# Patient Record
Sex: FEMALE | Race: WHITE | Employment: FULL TIME | URBAN - METROPOLITAN AREA
[De-identification: names, ages, dates, MRNs, and addresses within clinical notes are randomized per-mention and may not be internally consistent; named-entity substitution may affect disease eponyms.]

---

## 2017-08-05 ENCOUNTER — HOSPITAL ENCOUNTER (EMERGENCY)
Facility: CLINIC | Age: 22
Discharge: HOME | End: 2017-08-05
Attending: EMERGENCY MEDICINE | Admitting: EMERGENCY MEDICINE
Payer: COMMERCIAL

## 2017-08-05 VITALS
TEMPERATURE: 98.1 F | RESPIRATION RATE: 20 BRPM | DIASTOLIC BLOOD PRESSURE: 78 MMHG | SYSTOLIC BLOOD PRESSURE: 117 MMHG | OXYGEN SATURATION: 98 % | HEIGHT: 64 IN | BODY MASS INDEX: 21.34 KG/M2 | WEIGHT: 125 LBS

## 2017-08-05 DIAGNOSIS — S05.02XA CORNEAL ABRASION, LEFT, INITIAL ENCOUNTER: ICD-10-CM

## 2017-08-05 DIAGNOSIS — H16.9 KERATITIS: ICD-10-CM

## 2017-08-05 PROCEDURE — 99283 EMERGENCY DEPT VISIT LOW MDM: CPT

## 2017-08-05 RX ORDER — OFLOXACIN 3 MG/ML
1 SOLUTION/ DROPS OPHTHALMIC EVERY 4 HOURS
Qty: 1 BOTTLE | Refills: 0 | Status: SHIPPED | OUTPATIENT
Start: 2017-08-05

## 2017-08-05 RX ORDER — PROPARACAINE HYDROCHLORIDE 5 MG/ML
SOLUTION/ DROPS OPHTHALMIC
Status: DISCONTINUED
Start: 2017-08-05 | End: 2017-08-05 | Stop reason: HOSPADM

## 2017-08-05 RX ORDER — HYDROCODONE BITARTRATE AND ACETAMINOPHEN 5; 325 MG/1; MG/1
1-2 TABLET ORAL EVERY 4 HOURS PRN
Qty: 15 TABLET | Refills: 0 | Status: SHIPPED | OUTPATIENT
Start: 2017-08-05

## 2017-08-05 ASSESSMENT — ENCOUNTER SYMPTOMS
NUMBNESS: 0
LIGHT-HEADEDNESS: 0
EYE ITCHING: 0
CHILLS: 0
EYE DISCHARGE: 0
HEADACHES: 0
FACIAL SWELLING: 0
FATIGUE: 0
DIZZINESS: 0
EYE PAIN: 1
FEVER: 0

## 2017-08-05 ASSESSMENT — VISUAL ACUITY
OD: NOT TESTED
OS: NOT TESTED

## 2017-08-05 NOTE — ED AVS SNAPSHOT
Emergency Department    [address] [place] [address]    Phone:  [phone]    Fax:  [phone]                                       Ciera Talley   MRN: [id number]    Department:   Emergency Department   Date of Visit:  8/5/2017           After Visit Summary Signature Page     I have received my discharge instructions, and my questions have been answered. I have discussed any challenges I see with this plan with the nurse or doctor.    ..........................................................................................................................................  Patient/Patient Representative Signature      ..........................................................................................................................................  Patient Representative Print Name and Relationship to Patient    ..................................................               ................................................  Date                                            Time    ..........................................................................................................................................  Reviewed by Signature/Title    ...................................................              ..............................................  Date                                                            Time

## 2017-08-05 NOTE — ED PROVIDER NOTES
"  History     Chief Complaint:  Eye Pain (Pt states started having R eye pain this morning, she wears contacts, but does not think one is stuck.  She denies any trauma to eye.)      LE Talley is a 21 year old female who presents with acute onset right eye pain.  Woke up this morning feeling slightly irritated and right eye, states her contacts were dry yesterday and she was able to \"squeezing a bubble out of them\" and then remove them as normal.  Went to bed with no ocular symptoms.    States pain is constant severe burning and sharp at the right eye, made worse by opening eye and any light perception.  There is no fevers shakes chills no facial swelling.  Never had this issue before, as her contacts for years without complications    Allergies:  No Known Allergies     Medications:      Sertraline HCl (ZOLOFT PO)   ofloxacin (OCUFLOX) 0.3 % ophthalmic solution       Problem List:    There are no active problems to display for this patient.       Past Medical History:    Past Medical History:   Diagnosis Date     Leukemia (H)        Past Surgical History:    History reviewed. No pertinent surgical history.    Family History:    No family history on file.    Social History:  Marital Status:    Social History   Substance Use Topics     Smoking status: Never Smoker     Smokeless tobacco: Never Used     Alcohol use Yes      Comment: weekends        Review of Systems   Constitutional: Negative for chills, fatigue and fever.   HENT: Negative for dental problem, ear discharge, ear pain and facial swelling.    Eyes: Positive for pain. Negative for discharge and itching.   Neurological: Negative for dizziness, light-headedness, numbness and headaches.       Physical Exam   First Vitals:  BP: 117/78  Heart Rate: 96  Temp: 98.1  F (36.7  C)  Resp: 20  Height: 162.6 cm (5' 4\")  Weight: 56.7 kg (125 lb)  SpO2: 98 %    Physical Exam  General: Seen lying in bed, well appearing, alert and pleasant  Eyes:   OU:  VF in tact " "x 4  EOMI  PERRLA    OD:  Red and injected conj  + photophobia (non consensual)  + Flouro uptake in punctate circular pattern over cornea  Neg sidels  No ice rink  No FB seen  Ant chamber clear    CV: No JVD, no pallor  Resp: no tachypnea, speaking in full sentences   Skin: no rashes seen, no e/o trauma  Neuro: SALCIDO spontaneously        Emergency Department Course   Interventions:  Immediate pain relief with proparacaine      Impression & Plan      Medical Decision Makin-year-old female who appears to be acute punctate keratitis.  Supporting this diagnosis is immediate pain relief with proparacaine, and punctate fluorescein uptake on exam.  Patient was has a normal visual exam, I appreciate asymmetry in her visual acuity, but patient exam done without contact lenses, and patient states that she is \"near blind\" without assistance.  He has no evidence of iritis or endophthalmitis, anterior chamber is clear.  Given fluorescein pattern and contact use, will treat with Ocuflox antibiotics and recommend pain medication.  Patient states she can follow with her ophthalmologist the next 48 hours.  Very clear return ED precautions were given to patient, understands not to wear contacts until all symptoms are relieved.    Diagnosis:    ICD-10-CM    1. Keratitis H16.9    2. Corneal abrasion, left, initial encounter S05.02XA        Disposition:  Discharged home    Discharge Medications:  New Prescriptions    OFLOXACIN (OCUFLOX) 0.3 % OPHTHALMIC SOLUTION    Apply 1 drop to eye every 4 hours         Mike Lange  2017    EMERGENCY DEPARTMENT       Mike Lange MD  17 1246    "

## 2017-08-05 NOTE — ED AVS SNAPSHOT
Emergency Department    [address] [facility] [address]    Phone:  [phone]    Fax:  [phone]                                       Ciera Talley   MRN: [id number]    Department:   Emergency Department   Date of Visit:  8/5/2017           Patient Information     Date Of Birth          1995        Your diagnoses for this visit were:     Keratitis     Corneal abrasion, left, initial encounter        You were seen by Mike Lange MD.      Follow-up Information     Follow up with  Emergency Department.    Specialty:  EMERGENCY MEDICINE    Why:  If symptoms worsen    Contact information:    [address] [address] [phone]        Please follow up.    Why:  OPHTHALMOLOGY APPOINTMENT IN NEXT 2-3 DAYS FOR FOLLOW UP.          Follow up with [facility]. Schedule an appointment as soon as possible for a visit in 2 days.    Contact information:    [address] [facility]  [facility] [phone]        Discharge Instructions         Contact Lens Injury    Your contact lens can cause injury to the cornea (the clear part in the front of the eye). This can occur by sleeping with a hard or soft contact lens in place or wearing a contact lens longer than advised. There is also an increased risk of injury if your eyes dry out too much while wearing a contact lens.  The cornea is very painful when injured, but it usually heals quickly. It usually improves within 24 to 48 hours. If the injury is deep, your healthcare provider may apply an eye patch. This is to reduce pain and speed up the healing process. An antibiotic ointment or eye drops may also be used. Healing is complete when the pain stops and there are no other symptoms, such as eye redness, tearing or discharge, or worsening vision.  Home care    Do not wear contacts until you are pain free.    A cold pack (ice in a plastic bag, wrapped  in a towel) may be applied over the eye for 20 minutes at a time to reduce pain.    Acetaminophen or ibuprofen can be used for pain, unless another medicine was prescribed. (Note: If you have chronic liver or kidney disease, or have ever had a stomach ulcer or gastrointestinal bleeding, talk with your healthcare provider before using these medicines.)    If an eye patch was applied:    Apply the ice pack directly over the eye patch as described above.    If you were given a  follow-up appointment for patch removal and re-exam, do not miss it. An eye patch should not be left in place for more than 48 hours, unless advised to do so by your healthcare provider.    Do not drive a motor vehicle or operate machinery with the patch in place. You will have difficulty in judging distances with only one eye.  Follow-up care  Follow up with your healthcare provider, or as advised.  When to seek medical advice  Call your healthcare provider right away if any of these occur:    Increasing eye pain or pain that does not improve after 24 hours    Discharge from the eye    Increasing redness of the eye or swelling of the eyelids    Worsening vision  Date Last Reviewed: 6/14/2015 2000-2017 [employer]. [address] [place] [address]. All rights reserved. This information is not intended as a substitute for professional medical care. Always follow your healthcare professional's instructions.          Corneal Abrasion    You have received a scratch or scrape (abrasion) to your cornea. The cornea is the clear part in the front of the eye. This sensitive area is very painful when injured. You may make tears frequently, and your vision may be blurry until the injury heals. You may be sensitive to light.  This part of the body heals quickly. You can expect the pain to go away within 24 to 48 hours. If the abrasion is large or deep, your doctor may apply an eye patch, although this is not always done. An  antibiotic ointment or eye drops may also be used to prevent infection.  Numbing drops may be used to relieve the pain temporarily so that your eyes can be examined. However, these drops cannot be prescribed for home use because that would prevent healing and lead to more serious problems. Also, if you can t feel your eye, there is a chance of accidentally injuring it further without knowing it.  Home care    A cold pack (ice in a plastic bag, wrapped in a towel) may be applied over the eye (or eye patch) for 20 minutes at a time, to reduce pain.    You may use acetaminophen or ibuprofen to control pain, unless another pain medicine was prescribed. Note: If you have chronic liver or kidney disease or ever had a stomach ulcer or GI bleeding, talk with your doctor before using these medicines.    Rest your eyes and do not read until symptoms are gone.    If you use contact lenses, do not wear them until all symptoms are gone.    If your vision is affected by the corneal abrasion or if an eye patch was applied, do not drive a motor vehicle or operate machinery until all symptoms are gone. You may have trouble judging distances using only one eye.    If your eyes are sensitive to light, try wearing sunglasses, or stay indoors until symptoms go away.  Follow-up care  Follow up with your health care provider, or as advised.    If no patch was put on your eye, and used but the pain continues for more than 48 hours, you should have another exam. Return to this facility or contact your health care provider to arrange this.    If your eye was patched and you were asked to remove the patch yourself, see your health care provider. You may also return to this facility if you still have pain after the patch is removed.    If you were given a return appointment for patch removal and re-examination, be sure to keep the appointment. Leaving the patch in place longer than advised could be harmful.  When to seek medical advice  Call  your health care provider right away if any of these occur.    Increasing eye pain or pain that does not improve after 24 hours    Discharge from the eye    Increasing redness of the eye or swelling of the eyelids    Worsening vision    Symptoms that worsen after the abrasion has healed  Date Last Reviewed: 6/14/2015 2000-2017 [employer]. [address] [place] [address]. All rights reserved. This information is not intended as a substitute for professional medical care. Always follow your healthcare professional's instructions.          24 Hour Appointment Hotline       To make an appointment at any [facility], call [phone] ([phone]). If you don't have a family doctor or clinic, we will help you find one. [facility] clinics are conveniently located to serve the needs of you and your family.             Review of your medicines      START taking        Dose / Directions Last dose taken    ofloxacin 0.3 % ophthalmic solution   Commonly known as:  OCUFLOX   Dose:  1 drop   Quantity:  1 Bottle        Apply 1 drop to eye every 4 hours   Refills:  0          Our records show that you are taking the medicines listed below. If these are incorrect, please call your family doctor or clinic.        Dose / Directions Last dose taken    ZOLOFT PO        Take by mouth daily   Refills:  0                Prescriptions were sent or printed at these locations (1 Prescription)                   Other Prescriptions                Printed at Department/Unit printer (1 of 1)         ofloxacin (OCUFLOX) 0.3 % ophthalmic solution                Orders Needing Specimen Collection     None      Pending Results     No orders found from 8/3/2017 to 8/6/2017.            Pending Culture Results     No orders found from 8/3/2017 to 8/6/2017.            Pending Results Instructions     If you had any lab results that were not finalized at the time of your Discharge, you can call the ED Lab Result RN at  [phone]. You will be contacted by this team for any positive Lab results or changes in treatment. The nurses are available 7 days a week from 10A to 6:30P.  You can leave a message 24 hours per day and they will return your call.        Test Results From Your Hospital Stay               Clinical Quality Measure: Blood Pressure Screening     Your blood pressure was checked while you were in the emergency department today. The last reading we obtained was  BP: 117/78 . Please read the guidelines below about what these numbers mean and what you should do about them.  If your systolic blood pressure (the top number) is less than 120 and your diastolic blood pressure (the bottom number) is less than 80, then your blood pressure is normal. There is nothing more that you need to do about it.  If your systolic blood pressure (the top number) is 120-139 or your diastolic blood pressure (the bottom number) is 80-89, your blood pressure may be higher than it should be. You should have your blood pressure rechecked within a year by a primary care provider.  If your systolic blood pressure (the top number) is 140 or greater or your diastolic blood pressure (the bottom number) is 90 or greater, you may have high blood pressure. High blood pressure is treatable, but if left untreated over time it can put you at risk for heart attack, stroke, or kidney failure. You should have your blood pressure rechecked by a primary care provider within the next 4 weeks.  If your provider in the emergency department today gave you specific instructions to follow-up with your doctor or provider even sooner than that, you should follow that instruction and not wait for up to 4 weeks for your follow-up visit.        Thank you for choosing [facility]       Thank you for choosing [facility] for your care. Our goal is always to provide you with excellent care. Hearing back from our patients is one way we can continue to improve our services. Please  "take a few minutes to complete the written survey that you may receive in the mail after you visit with us. Thank you!        [employer] Information     [employer] lets you send messages to your doctor, view your test results, renew your prescriptions, schedule appointments and more. To sign up, go to [email or web address] . Click on \"Log in\" on the left side of the screen, which will take you to the Welcome page. Then click on \"Sign up Now\" on the right side of the page.     You will be asked to enter the access code listed below, as well as some personal information. Please follow the directions to create your username and password.     Your access code is: [email or web address]  Expires: 11/3/2017 12:44 PM     Your access code will  in 90 days. If you need help or a new code, please call your [facility] clinic or [phone].        Care EveryWhere ID     This is your Care EveryWhere ID. This could be used by other organizations to access your [facility] medical records  [id number]        Equal Access to Services     [facility]: Hadshana Steel, waaxda lukwasi, qaybta kaaljulia sousa, jayson thompson . So [facility] [phone].    ATENCIÓN: Si habla español, tiene a wilcox disposición servicios gratuitos de asistencia lingüística. Llame al [phone].    We comply with applicable federal civil rights laws and [place] laws. We do not discriminate on the basis of race, color, national origin, age, disability sex, sexual orientation or gender identity.            After Visit Summary       This is your record. Keep this with you and show to your community pharmacist(s) and doctor(s) at your next visit.                  "

## 2017-08-05 NOTE — DISCHARGE INSTRUCTIONS
Contact Lens Injury    Your contact lens can cause injury to the cornea (the clear part in the front of the eye). This can occur by sleeping with a hard or soft contact lens in place or wearing a contact lens longer than advised. There is also an increased risk of injury if your eyes dry out too much while wearing a contact lens.  The cornea is very painful when injured, but it usually heals quickly. It usually improves within 24 to 48 hours. If the injury is deep, your healthcare provider may apply an eye patch. This is to reduce pain and speed up the healing process. An antibiotic ointment or eye drops may also be used. Healing is complete when the pain stops and there are no other symptoms, such as eye redness, tearing or discharge, or worsening vision.  Home care    Do not wear contacts until you are pain free.    A cold pack (ice in a plastic bag, wrapped in a towel) may be applied over the eye for 20 minutes at a time to reduce pain.    Acetaminophen or ibuprofen can be used for pain, unless another medicine was prescribed. (Note: If you have chronic liver or kidney disease, or have ever had a stomach ulcer or gastrointestinal bleeding, talk with your healthcare provider before using these medicines.)    If an eye patch was applied:    Apply the ice pack directly over the eye patch as described above.    If you were given a  follow-up appointment for patch removal and re-exam, do not miss it. An eye patch should not be left in place for more than 48 hours, unless advised to do so by your healthcare provider.    Do not drive a motor vehicle or operate machinery with the patch in place. You will have difficulty in judging distances with only one eye.  Follow-up care  Follow up with your healthcare provider, or as advised.  When to seek medical advice  Call your healthcare provider right away if any of these occur:    Increasing eye pain or pain that does not improve after 24 hours    Discharge from the  eye    Increasing redness of the eye or swelling of the eyelids    Worsening vision  Date Last Reviewed: 6/14/2015 2000-2017 [employer]. [address] [place] [address], [place] [address]. All rights reserved. This information is not intended as a substitute for professional medical care. Always follow your healthcare professional's instructions.          Corneal Abrasion    You have received a scratch or scrape (abrasion) to your cornea. The cornea is the clear part in the front of the eye. This sensitive area is very painful when injured. You may make tears frequently, and your vision may be blurry until the injury heals. You may be sensitive to light.  This part of the body heals quickly. You can expect the pain to go away within 24 to 48 hours. If the abrasion is large or deep, your doctor may apply an eye patch, although this is not always done. An antibiotic ointment or eye drops may also be used to prevent infection.  Numbing drops may be used to relieve the pain temporarily so that your eyes can be examined. However, these drops cannot be prescribed for home use because that would prevent healing and lead to more serious problems. Also, if you can t feel your eye, there is a chance of accidentally injuring it further without knowing it.  Home care    A cold pack (ice in a plastic bag, wrapped in a towel) may be applied over the eye (or eye patch) for 20 minutes at a time, to reduce pain.    You may use acetaminophen or ibuprofen to control pain, unless another pain medicine was prescribed. Note: If you have chronic liver or kidney disease or ever had a stomach ulcer or GI bleeding, talk with your doctor before using these medicines.    Rest your eyes and do not read until symptoms are gone.    If you use contact lenses, do not wear them until all symptoms are gone.    If your vision is affected by the corneal abrasion or if an eye patch was applied, do not drive a motor vehicle or operate machinery  until all symptoms are gone. You may have trouble judging distances using only one eye.    If your eyes are sensitive to light, try wearing sunglasses, or stay indoors until symptoms go away.  Follow-up care  Follow up with your health care provider, or as advised.    If no patch was put on your eye, and used but the pain continues for more than 48 hours, you should have another exam. Return to this facility or contact your health care provider to arrange this.    If your eye was patched and you were asked to remove the patch yourself, see your health care provider. You may also return to this facility if you still have pain after the patch is removed.    If you were given a return appointment for patch removal and re-examination, be sure to keep the appointment. Leaving the patch in place longer than advised could be harmful.  When to seek medical advice  Call your health care provider right away if any of these occur.    Increasing eye pain or pain that does not improve after 24 hours    Discharge from the eye    Increasing redness of the eye or swelling of the eyelids    Worsening vision    Symptoms that worsen after the abrasion has healed  Date Last Reviewed: 6/14/2015 2000-2017 [employer]. [address] [place] [address], [place] [address]. All rights reserved. This information is not intended as a substitute for professional medical care. Always follow your healthcare professional's instructions.

## 2024-06-13 ENCOUNTER — OFFICE VISIT (OUTPATIENT)
Dept: URGENT CARE | Facility: URGENT CARE | Age: 29
End: 2024-06-13
Payer: COMMERCIAL

## 2024-06-13 VITALS
HEART RATE: 86 BPM | WEIGHT: 144.6 LBS | DIASTOLIC BLOOD PRESSURE: 80 MMHG | SYSTOLIC BLOOD PRESSURE: 123 MMHG | OXYGEN SATURATION: 100 % | BODY MASS INDEX: 24.82 KG/M2 | TEMPERATURE: 98.9 F | RESPIRATION RATE: 16 BRPM

## 2024-06-13 DIAGNOSIS — N30.01 ACUTE CYSTITIS WITH HEMATURIA: Primary | ICD-10-CM

## 2024-06-13 DIAGNOSIS — R30.0 DYSURIA: ICD-10-CM

## 2024-06-13 LAB
ALBUMIN UR-MCNC: NEGATIVE MG/DL
APPEARANCE UR: ABNORMAL
BACTERIA #/AREA URNS HPF: ABNORMAL /HPF
BILIRUB UR QL STRIP: NEGATIVE
CLUE CELLS: PRESENT
COLOR UR AUTO: YELLOW
GLUCOSE UR STRIP-MCNC: NEGATIVE MG/DL
HGB UR QL STRIP: ABNORMAL
KETONES UR STRIP-MCNC: NEGATIVE MG/DL
LEUKOCYTE ESTERASE UR QL STRIP: ABNORMAL
NITRATE UR QL: NEGATIVE
PH UR STRIP: 6 [PH] (ref 5–7)
RBC #/AREA URNS AUTO: ABNORMAL /HPF
SP GR UR STRIP: 1.01 (ref 1–1.03)
SQUAMOUS #/AREA URNS AUTO: ABNORMAL /LPF
TRICHOMONAS, WET PREP: ABNORMAL
UROBILINOGEN UR STRIP-ACNC: 0.2 E.U./DL
WBC #/AREA URNS AUTO: ABNORMAL /HPF
WBC'S/HIGH POWER FIELD, WET PREP: ABNORMAL
YEAST, WET PREP: ABNORMAL

## 2024-06-13 PROCEDURE — 81001 URINALYSIS AUTO W/SCOPE: CPT | Performed by: PHYSICIAN ASSISTANT

## 2024-06-13 PROCEDURE — 99204 OFFICE O/P NEW MOD 45 MIN: CPT | Performed by: PHYSICIAN ASSISTANT

## 2024-06-13 PROCEDURE — 87086 URINE CULTURE/COLONY COUNT: CPT | Performed by: PHYSICIAN ASSISTANT

## 2024-06-13 PROCEDURE — 87210 SMEAR WET MOUNT SALINE/INK: CPT | Performed by: PHYSICIAN ASSISTANT

## 2024-06-13 PROCEDURE — 87186 SC STD MICRODIL/AGAR DIL: CPT | Performed by: PHYSICIAN ASSISTANT

## 2024-06-13 RX ORDER — NITROFURANTOIN 25; 75 MG/1; MG/1
100 CAPSULE ORAL 2 TIMES DAILY
Qty: 14 CAPSULE | Refills: 0 | Status: SHIPPED | OUTPATIENT
Start: 2024-06-13 | End: 2024-06-20

## 2024-06-13 ASSESSMENT — ENCOUNTER SYMPTOMS
POLYDIPSIA: 0
RHINORRHEA: 0
ENDOCRINE NEGATIVE: 1
GASTROINTESTINAL NEGATIVE: 1
FATIGUE: 0
MYALGIAS: 0
HEADACHES: 0
ABDOMINAL PAIN: 0
NAUSEA: 0
PALPITATIONS: 0
NECK PAIN: 0
FREQUENCY: 1
ACTIVITY CHANGE: 0
ADENOPATHY: 0
WEAKNESS: 0
LIGHT-HEADEDNESS: 0
NECK STIFFNESS: 0
DIZZINESS: 0
SORE THROAT: 0
SHORTNESS OF BREATH: 0
CONSTITUTIONAL NEGATIVE: 1
CARDIOVASCULAR NEGATIVE: 1
CHILLS: 0
VOMITING: 0
COUGH: 0
FLANK PAIN: 0
FEVER: 0
DYSURIA: 1
HEMATURIA: 1
RESPIRATORY NEGATIVE: 1
NEUROLOGICAL NEGATIVE: 1
DIARRHEA: 0
MUSCULOSKELETAL NEGATIVE: 1

## 2024-06-13 ASSESSMENT — PAIN SCALES - GENERAL: PAINLEVEL: MILD PAIN (3)

## 2024-06-13 NOTE — PROGRESS NOTES
Chief Complaint:    Chief Complaint   Patient presents with    UTI     Urinary frequency, cloudy urine, odor, unable to complete flow. Pain is worse at night. Sx onset- Monday     Hematuria     ASSESSMENT  1. Acute cystitis with hematuria    2. Dysuria         PLAN    Urinalysis discussed with patient  We will call with culture results if resistant.  Wet prep was positive for clue cells.  No discharge or odor, will hold off on Flagyl today.  Rx for Macrobid today  Follow up with PCP in 1 week if symptoms are not improving.  Worrisome symptoms discussed with instructions to go to the ED.  Patient verbalized understanding and agreed with this plan.    Labs:     Results for orders placed or performed in visit on 06/13/24   UA Macroscopic with reflex to Microscopic and Culture - Lab Collect     Status: Abnormal    Specimen: Urine, Midstream   Result Value Ref Range    Color Urine Yellow Colorless, Straw, Light Yellow, Yellow    Appearance Urine Slightly Cloudy (A) Clear    Glucose Urine Negative Negative mg/dL    Bilirubin Urine Negative Negative    Ketones Urine Negative Negative mg/dL    Specific Gravity Urine 1.010 1.003 - 1.035    Blood Urine Small (A) Negative    pH Urine 6.0 5.0 - 7.0    Protein Albumin Urine Negative Negative mg/dL    Urobilinogen Urine 0.2 0.2, 1.0 E.U./dL    Nitrite Urine Negative Negative    Leukocyte Esterase Urine Small (A) Negative   UA Microscopic with Reflex to Culture     Status: Abnormal   Result Value Ref Range    Bacteria Urine Moderate (A) None Seen /HPF    RBC Urine 0-2 0-2 /HPF /HPF    WBC Urine 5-10 (A) 0-5 /HPF /HPF    Squamous Epithelials Urine Many (A) None Seen /LPF    Narrative    Urine Culture not indicated   Wet prep - lab collect     Status: Abnormal    Specimen: Vagina; Swab   Result Value Ref Range    Trichomonas Absent Absent    Yeast Absent Absent    Clue Cells Present (A) Absent    WBCs/high power field 3+ (A) None       Problem history    There is no problem list on  file for this patient.      Current Meds    Current Outpatient Medications:     levonorgestrel (MIRENA) 52 MG (20 mcg/day) IUD, 1 each by Intrauterine route, Disp: , Rfl:     nitroFURantoin macrocrystal-monohydrate (MACROBID) 100 MG capsule, Take 1 capsule (100 mg) by mouth 2 times daily for 7 days, Disp: 14 capsule, Rfl: 0    HYDROcodone-acetaminophen (NORCO) 5-325 MG per tablet, Take 1-2 tablets by mouth every 4 hours as needed for moderate to severe pain (Patient not taking: Reported on 6/13/2024), Disp: 15 tablet, Rfl: 0    ofloxacin (OCUFLOX) 0.3 % ophthalmic solution, Apply 1 drop to eye every 4 hours (Patient not taking: Reported on 6/13/2024), Disp: 1 Bottle, Rfl: 0    Sertraline HCl (ZOLOFT PO), Take by mouth daily (Patient not taking: Reported on 6/13/2024), Disp: , Rfl:     Allergies  No Known Allergies    SUBJECTIVE    HPI:  Ciera Talley is a 28 year old female who has symptoms of urinary dysuria, urgency, frequency, and hematuria for 3 day(s).  she denies back pain, nausea, vomiting, fever, and chills, flank pain, vaginal discharge, and vaginal odor.    Patient is new to [facility].      ROS:      Review of Systems   Constitutional: Negative.  Negative for activity change, chills, fatigue and fever.   HENT:  Negative for congestion, ear pain, rhinorrhea and sore throat.    Respiratory: Negative.  Negative for cough and shortness of breath.    Cardiovascular: Negative.  Negative for chest pain and palpitations.   Gastrointestinal: Negative.  Negative for abdominal pain, diarrhea, nausea and vomiting.   Endocrine: Negative.  Negative for polydipsia and polyuria.   Genitourinary:  Positive for dysuria, frequency, hematuria and urgency. Negative for flank pain, pelvic pain, vaginal discharge and vaginal pain.   Musculoskeletal: Negative.  Negative for myalgias, neck pain and neck stiffness.   Allergic/Immunologic: Negative for immunocompromised state.   Neurological: Negative.  Negative for  dizziness, weakness, light-headedness and headaches.   Hematological:  Negative for adenopathy.       Family History   No family history on file.     Social History  Social History     Socioeconomic History    Marital status: Single     Spouse name: Not on file    Number of children: Not on file    Years of education: Not on file    Highest education level: Not on file   Occupational History    Not on file   Tobacco Use    Smoking status: Never    Smokeless tobacco: Never   Substance and Sexual Activity    Alcohol use: Yes     Comment: weekends    Drug use: No    Sexual activity: Not on file   Other Topics Concern    Not on file   Social History Narrative    Not on file     Social Determinants of Health     Financial Resource Strain: Not on file   Food Insecurity: Not on file   Transportation Needs: Not on file   Physical Activity: Not on file   Stress: Not on file   Social Connections: Not on file   Interpersonal Safety: Not on file   Housing Stability: Not on file           OBJECTIVE     Vital signs noted and reviewed by Terrance Rea PA-C  /80 (BP Location: Left arm, Patient Position: Sitting, Cuff Size: Adult Regular)   Pulse 86   Temp 98.9  F (37.2  C) (Tympanic)   Resp 16   Wt 65.6 kg (144 lb 9.6 oz)   SpO2 100%   BMI 24.82 kg/m       Physical Exam  Vitals and nursing note reviewed.   Constitutional:       General: She is not in acute distress.     Appearance: Normal appearance. She is well-developed. She is not ill-appearing, toxic-appearing or diaphoretic.   HENT:      Head: Normocephalic and atraumatic.      Right Ear: Tympanic membrane and external ear normal.      Left Ear: Tympanic membrane and external ear normal.   Eyes:      Pupils: Pupils are equal, round, and reactive to light.   Cardiovascular:      Rate and Rhythm: Normal rate and regular rhythm.      Heart sounds: Normal heart sounds. No murmur heard.     No friction rub. No gallop.   Pulmonary:      Effort: Pulmonary effort is  normal. No respiratory distress.      Breath sounds: Normal breath sounds. No wheezing or rales.   Chest:      Chest wall: No tenderness.   Abdominal:      General: Bowel sounds are normal. There is no distension.      Palpations: Abdomen is soft. Abdomen is not rigid. There is no mass.      Tenderness: There is no abdominal tenderness. There is no guarding or rebound. Negative signs include Iqbal's sign and McBurney's sign.   Musculoskeletal:      Cervical back: Normal range of motion and neck supple.   Lymphadenopathy:      Cervical: No cervical adenopathy.   Skin:     General: Skin is warm and dry.   Neurological:      Mental Status: She is alert and oriented to person, place, and time.      Cranial Nerves: No cranial nerve deficit.      Deep Tendon Reflexes: Reflexes are normal and symmetric.   Psychiatric:         Behavior: Behavior normal. Behavior is cooperative.         Thought Content: Thought content normal.         Judgment: Judgment normal.             Terrance Rea PA-C  6/13/2024, 1:58 PM

## 2024-06-14 LAB — BACTERIA UR CULT: ABNORMAL

## 2024-06-18 ENCOUNTER — TELEPHONE (OUTPATIENT)
Dept: URGENT CARE | Facility: URGENT CARE | Age: 29
End: 2024-06-18
Payer: COMMERCIAL